# Patient Record
Sex: FEMALE | Race: WHITE
[De-identification: names, ages, dates, MRNs, and addresses within clinical notes are randomized per-mention and may not be internally consistent; named-entity substitution may affect disease eponyms.]

---

## 2019-10-12 ENCOUNTER — HOSPITAL ENCOUNTER (INPATIENT)
Dept: HOSPITAL 63 - ER | Age: 49
LOS: 3 days | Discharge: PSYCHIATRIC OR SUBSTANCE USE FACILITY | DRG: 918 | End: 2019-10-15
Attending: INTERNAL MEDICINE | Admitting: INTERNAL MEDICINE
Payer: COMMERCIAL

## 2019-10-12 VITALS — DIASTOLIC BLOOD PRESSURE: 86 MMHG | SYSTOLIC BLOOD PRESSURE: 128 MMHG

## 2019-10-12 VITALS — SYSTOLIC BLOOD PRESSURE: 141 MMHG | DIASTOLIC BLOOD PRESSURE: 78 MMHG

## 2019-10-12 VITALS — DIASTOLIC BLOOD PRESSURE: 81 MMHG | SYSTOLIC BLOOD PRESSURE: 142 MMHG

## 2019-10-12 VITALS — SYSTOLIC BLOOD PRESSURE: 152 MMHG | DIASTOLIC BLOOD PRESSURE: 85 MMHG

## 2019-10-12 VITALS — SYSTOLIC BLOOD PRESSURE: 152 MMHG | DIASTOLIC BLOOD PRESSURE: 96 MMHG

## 2019-10-12 VITALS — DIASTOLIC BLOOD PRESSURE: 90 MMHG | SYSTOLIC BLOOD PRESSURE: 149 MMHG

## 2019-10-12 VITALS — DIASTOLIC BLOOD PRESSURE: 70 MMHG | SYSTOLIC BLOOD PRESSURE: 124 MMHG

## 2019-10-12 VITALS — WEIGHT: 128 LBS | HEIGHT: 65 IN | BODY MASS INDEX: 21.33 KG/M2

## 2019-10-12 VITALS — SYSTOLIC BLOOD PRESSURE: 153 MMHG | DIASTOLIC BLOOD PRESSURE: 87 MMHG

## 2019-10-12 VITALS — DIASTOLIC BLOOD PRESSURE: 89 MMHG | SYSTOLIC BLOOD PRESSURE: 142 MMHG

## 2019-10-12 VITALS — DIASTOLIC BLOOD PRESSURE: 90 MMHG | SYSTOLIC BLOOD PRESSURE: 142 MMHG

## 2019-10-12 VITALS — DIASTOLIC BLOOD PRESSURE: 82 MMHG | SYSTOLIC BLOOD PRESSURE: 134 MMHG

## 2019-10-12 VITALS — SYSTOLIC BLOOD PRESSURE: 155 MMHG | DIASTOLIC BLOOD PRESSURE: 87 MMHG

## 2019-10-12 VITALS — DIASTOLIC BLOOD PRESSURE: 79 MMHG | SYSTOLIC BLOOD PRESSURE: 151 MMHG

## 2019-10-12 DIAGNOSIS — F17.210: ICD-10-CM

## 2019-10-12 DIAGNOSIS — Z79.891: ICD-10-CM

## 2019-10-12 DIAGNOSIS — Z90.710: ICD-10-CM

## 2019-10-12 DIAGNOSIS — M54.9: ICD-10-CM

## 2019-10-12 DIAGNOSIS — T48.1X2A: Primary | ICD-10-CM

## 2019-10-12 DIAGNOSIS — M41.9: ICD-10-CM

## 2019-10-12 DIAGNOSIS — T40.602A: ICD-10-CM

## 2019-10-12 DIAGNOSIS — I10: ICD-10-CM

## 2019-10-12 DIAGNOSIS — Z91.5: ICD-10-CM

## 2019-10-12 DIAGNOSIS — E78.5: ICD-10-CM

## 2019-10-12 DIAGNOSIS — G89.29: ICD-10-CM

## 2019-10-12 DIAGNOSIS — F32.9: ICD-10-CM
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
## 2019-10-12 DIAGNOSIS — E78.00: ICD-10-CM

## 2019-10-12 LAB
ACETAMIN: < 2 MCG/ML (ref 10–30)
ALBUMIN SERPL-MCNC: 3.8 G/DL (ref 3.4–5)
ALBUMIN SERPL-MCNC: 4.4 G/DL (ref 3.4–5)
ALBUMIN/GLOB SERPL: 1.1 {RATIO} (ref 1–1.7)
ALBUMIN/GLOB SERPL: 1.1 {RATIO} (ref 1–1.7)
ALP SERPL-CCNC: 64 U/L (ref 46–116)
ALP SERPL-CCNC: 76 U/L (ref 46–116)
ALT SERPL-CCNC: 18 U/L (ref 14–59)
ALT SERPL-CCNC: 22 U/L (ref 14–59)
AMORPH SED URNS QL MICRO: PRESENT /HPF
AMPHETAMINE/METHAMPHETAMINE: (no result)
ANION GAP SERPL CALC-SCNC: 11 MMOL/L (ref 6–14)
ANION GAP SERPL CALC-SCNC: 13 MMOL/L (ref 6–14)
APTT PPP: YELLOW S
AST SERPL-CCNC: 19 U/L (ref 15–37)
AST SERPL-CCNC: 20 U/L (ref 15–37)
BACTERIA #/AREA URNS HPF: 0 /HPF
BARBITURATES UR-MCNC: (no result) UG/ML
BASOPHILS # BLD AUTO: 0 X10^3/UL (ref 0–0.2)
BASOPHILS # BLD AUTO: 0 X10^3/UL (ref 0–0.2)
BASOPHILS NFR BLD: 0 % (ref 0–3)
BASOPHILS NFR BLD: 0 % (ref 0–3)
BENZODIAZ UR-MCNC: (no result) UG/L
BILIRUB SERPL-MCNC: 0.3 MG/DL (ref 0.2–1)
BILIRUB SERPL-MCNC: 0.4 MG/DL (ref 0.2–1)
BILIRUB UR QL STRIP: (no result)
BUN/CREAT SERPL: 16 (ref 6–20)
BUN/CREAT SERPL: 25 (ref 6–20)
CA-I SERPL ISE-MCNC: 13 MG/DL (ref 7–20)
CA-I SERPL ISE-MCNC: 20 MG/DL (ref 7–20)
CALCIUM SERPL-MCNC: 8.4 MG/DL (ref 8.5–10.1)
CALCIUM SERPL-MCNC: 9.5 MG/DL (ref 8.5–10.1)
CANNABINOIDS UR-MCNC: (no result) UG/L
CHLORIDE SERPL-SCNC: 103 MMOL/L (ref 98–107)
CHLORIDE SERPL-SCNC: 109 MMOL/L (ref 98–107)
CO2 SERPL-SCNC: 24 MMOL/L (ref 21–32)
CO2 SERPL-SCNC: 28 MMOL/L (ref 21–32)
COCAINE UR-MCNC: (no result) NG/ML
CREAT SERPL-MCNC: 0.8 MG/DL (ref 0.6–1)
CREAT SERPL-MCNC: 0.8 MG/DL (ref 0.6–1)
EOSINOPHIL NFR BLD: 0 % (ref 0–3)
EOSINOPHIL NFR BLD: 0 % (ref 0–3)
EOSINOPHIL NFR BLD: 0 X10^3/UL (ref 0–0.7)
EOSINOPHIL NFR BLD: 0 X10^3/UL (ref 0–0.7)
ERYTHROCYTE [DISTWIDTH] IN BLOOD BY AUTOMATED COUNT: 13.4 % (ref 11.5–14.5)
ERYTHROCYTE [DISTWIDTH] IN BLOOD BY AUTOMATED COUNT: 13.5 % (ref 11.5–14.5)
ETHANOL SERPL-MCNC: < 10 MG/DL (ref 0–10)
FIBRINOGEN PPP-MCNC: (no result) MG/DL
GFR SERPLBLD BASED ON 1.73 SQ M-ARVRAT: 75.6 ML/MIN
GFR SERPLBLD BASED ON 1.73 SQ M-ARVRAT: 75.6 ML/MIN
GLOBULIN SER-MCNC: 3.4 G/DL (ref 2.2–3.8)
GLOBULIN SER-MCNC: 4.1 G/DL (ref 2.2–3.8)
GLUCOSE SERPL-MCNC: 108 MG/DL (ref 70–99)
GLUCOSE SERPL-MCNC: 113 MG/DL (ref 70–99)
GLUCOSE UR STRIP-MCNC: (no result) MG/DL
HCT VFR BLD CALC: 37.6 % (ref 36–47)
HCT VFR BLD CALC: 42 % (ref 36–47)
HGB BLD-MCNC: 12.2 G/DL (ref 12–15.5)
HGB BLD-MCNC: 14.3 G/DL (ref 12–15.5)
LYMPHOCYTES # BLD: 1 X10^3/UL (ref 1–4.8)
LYMPHOCYTES # BLD: 1.1 X10^3/UL (ref 1–4.8)
LYMPHOCYTES NFR BLD AUTO: 15 % (ref 24–48)
LYMPHOCYTES NFR BLD AUTO: 9 % (ref 24–48)
MAGNESIUM SERPL-MCNC: 2.2 MG/DL (ref 1.8–2.4)
MAGNESIUM SERPL-MCNC: 2.7 MG/DL (ref 1.8–2.4)
MCH RBC QN AUTO: 30 PG (ref 25–35)
MCH RBC QN AUTO: 32 PG (ref 25–35)
MCHC RBC AUTO-ENTMCNC: 33 G/DL (ref 31–37)
MCHC RBC AUTO-ENTMCNC: 34 G/DL (ref 31–37)
MCV RBC AUTO: 92 FL (ref 79–100)
MCV RBC AUTO: 93 FL (ref 79–100)
METHADONE SERPL-MCNC: (no result) NG/ML
MONO #: 0.4 X10^3/UL (ref 0–1.1)
MONO #: 0.7 X10^3/UL (ref 0–1.1)
MONOCYTES NFR BLD: 5 % (ref 0–9)
MONOCYTES NFR BLD: 5 % (ref 0–9)
NEUT #: 10.4 X10^3UL (ref 1.8–7.7)
NEUT #: 6 X10^3UL (ref 1.8–7.7)
NEUTROPHILS NFR BLD AUTO: 80 % (ref 31–73)
NEUTROPHILS NFR BLD AUTO: 86 % (ref 31–73)
NITRITE UR QL STRIP: (no result)
OPIATES UR-MCNC: (no result) NG/ML
PCP SERPL-MCNC: (no result) MG/DL
PLATELET # BLD AUTO: 268 X10^3/UL (ref 140–400)
PLATELET # BLD AUTO: 299 X10^3/UL (ref 140–400)
POTASSIUM SERPL-SCNC: 3.7 MMOL/L (ref 3.5–5.1)
POTASSIUM SERPL-SCNC: 4 MMOL/L (ref 3.5–5.1)
PROT SERPL-MCNC: 7.2 G/DL (ref 6.4–8.2)
PROT SERPL-MCNC: 8.5 G/DL (ref 6.4–8.2)
RBC # BLD AUTO: 4.05 X10^6/UL (ref 3.5–5.4)
RBC # BLD AUTO: 4.55 X10^6/UL (ref 3.5–5.4)
RBC #/AREA URNS HPF: 0 /HPF (ref 0–2)
SALIC: 2.4 MG/DL (ref 2.8–20)
SODIUM SERPL-SCNC: 142 MMOL/L (ref 136–145)
SODIUM SERPL-SCNC: 146 MMOL/L (ref 136–145)
SP GR UR STRIP: 1.01
SQUAMOUS #/AREA URNS LPF: (no result) /LPF
UROBILINOGEN UR-MCNC: 0.2 MG/DL
WBC # BLD AUTO: 12.2 X10^3/UL (ref 4–11)
WBC # BLD AUTO: 7.6 X10^3/UL (ref 4–11)
WBC #/AREA URNS HPF: 0 /HPF (ref 0–4)

## 2019-10-12 PROCEDURE — 36415 COLL VENOUS BLD VENIPUNCTURE: CPT

## 2019-10-12 PROCEDURE — 80061 LIPID PANEL: CPT

## 2019-10-12 PROCEDURE — 80329 ANALGESICS NON-OPIOID 1 OR 2: CPT

## 2019-10-12 PROCEDURE — 80307 DRUG TEST PRSMV CHEM ANLYZR: CPT

## 2019-10-12 PROCEDURE — 85027 COMPLETE CBC AUTOMATED: CPT

## 2019-10-12 PROCEDURE — 81001 URINALYSIS AUTO W/SCOPE: CPT

## 2019-10-12 PROCEDURE — 93005 ELECTROCARDIOGRAM TRACING: CPT

## 2019-10-12 PROCEDURE — 82550 ASSAY OF CK (CPK): CPT

## 2019-10-12 PROCEDURE — 51702 INSERT TEMP BLADDER CATH: CPT

## 2019-10-12 PROCEDURE — 83735 ASSAY OF MAGNESIUM: CPT

## 2019-10-12 PROCEDURE — 82140 ASSAY OF AMMONIA: CPT

## 2019-10-12 PROCEDURE — 80048 BASIC METABOLIC PNL TOTAL CA: CPT

## 2019-10-12 PROCEDURE — G0480 DRUG TEST DEF 1-7 CLASSES: HCPCS

## 2019-10-12 PROCEDURE — 96361 HYDRATE IV INFUSION ADD-ON: CPT

## 2019-10-12 PROCEDURE — 85730 THROMBOPLASTIN TIME PARTIAL: CPT

## 2019-10-12 PROCEDURE — 96365 THER/PROPH/DIAG IV INF INIT: CPT

## 2019-10-12 PROCEDURE — 80053 COMPREHEN METABOLIC PANEL: CPT

## 2019-10-12 PROCEDURE — 83605 ASSAY OF LACTIC ACID: CPT

## 2019-10-12 PROCEDURE — 85610 PROTHROMBIN TIME: CPT

## 2019-10-12 PROCEDURE — 85025 COMPLETE CBC W/AUTO DIFF WBC: CPT

## 2019-10-12 RX ADMIN — SODIUM CHLORIDE SCH MLS/HR: 0.9 INJECTION, SOLUTION INTRAVENOUS at 14:08

## 2019-10-12 RX ADMIN — SODIUM CHLORIDE SCH MLS/HR: 0.9 INJECTION, SOLUTION INTRAVENOUS at 20:25

## 2019-10-12 NOTE — PDOC
Exam


Note:


[place] Note:


Please also refer to the separate dictated note~for this date of service 

dictated separately.~Patient seen individually. Discussed the patient with 

Nursing staff reviewed the chart.~Reviewed interim history and current 

functioning. Reviewed vital signs,~Labs/ Radiology~and current medications noted

below. Continue current treatment with the changes noted in the dictated 

addendum note





Assessment:


Vital Signs/I&O:





                                   Vital Signs








  Date Time  Temp Pulse Resp B/P (MAP) Pulse Ox O2 Delivery O2 Flow Rate FiO2


 


10/12/19 22:14  99 20     


 


10/12/19 22:11 98.9   142/90 (107) 98 Room Air  








Labs:





                                Laboratory Tests








Test


 10/12/19


08:30 10/12/19


18:00 10/12/19


18:04


 


White Blood Count


 7.6 x10^3/uL


(4.0-11.0) 12.2 x10^3/uL


(4.0-11.0)  H 





 


Red Blood Count


 4.55 x10^6/uL


(3.50-5.40) 4.05 x10^6/uL


(3.50-5.40) 





 


Hemoglobin


 14.3 g/dL


(12.0-15.5) 12.2 g/dL


(12.0-15.5) 





 


Hematocrit


 42.0 %


(36.0-47.0) 37.6 %


(36.0-47.0) 





 


Mean Corpuscular Volume


 92 fL ()


 93 fL ()


 





 


Mean Corpuscular Hemoglobin 32 pg (25-35)   30 pg (25-35)   


 


Mean Corpuscular Hemoglobin


Concent 34 g/dL


(31-37) 33 g/dL


(31-37) 





 


Red Cell Distribution Width


 13.5 %


(11.5-14.5) 13.4 %


(11.5-14.5) 





 


Platelet Count


 299 x10^3/uL


(140-400) 268 x10^3/uL


(140-400) 





 


Neutrophils (%) (Auto) 80 % (31-73)  H 86 % (31-73)  H 


 


Lymphocytes (%) (Auto) 15 % (24-48)  L 9 % (24-48)  L 


 


Monocytes (%) (Auto) 5 % (0-9)   5 % (0-9)   


 


Eosinophils (%) (Auto) 0 % (0-3)   0 % (0-3)   


 


Basophils (%) (Auto) 0 % (0-3)   0 % (0-3)   


 


Neutrophils # (Auto)


 6.0 x10^3uL


(1.8-7.7) 10.4 x10^3uL


(1.8-7.7)  H 





 


Lymphocytes # (Auto)


 1.1 x10^3/uL


(1.0-4.8) 1.0 x10^3/uL


(1.0-4.8) 





 


Monocytes # (Auto)


 0.4 x10^3/uL


(0.0-1.1) 0.7 x10^3/uL


(0.0-1.1) 





 


Eosinophils # (Auto)


 0.0 x10^3/uL


(0.0-0.7) 0.0 x10^3/uL


(0.0-0.7) 





 


Basophils # (Auto)


 0.0 x10^3/uL


(0.0-0.2) 0.0 x10^3/uL


(0.0-0.2) 





 


Prothrombin Time


 9.8 SEC


(9.4-11.4) 


 





 


Prothrombin Time INR 0.9 (0.9-1.1)    


 


Activated Partial


Thromboplast Time 26 SEC (23-33)


 


 





 


Urine Collection Type U cath    


 


Urine Color Yellow    


 


Urine Clarity Hazy    


 


Urine pH 5.0    


 


Urine Specific Gravity 1.010    


 


Urine Protein


 Neg


(NEG-TRACE) 


 





 


Urine Glucose (UA)


 Neg mg/dL


(NEG) 


 





 


Urine Ketones (Stick)


 Neg mg/dL


(NEG) 


 





 


Urine Blood Neg (NEG)    


 


Urine Nitrite Neg (NEG)    


 


Urine Bilirubin Neg (NEG)    


 


Urine Urobilinogen Dipstick


 0.2 mg/dL (0.2


mg/dL) 


 





 


Urine Leukocyte Esterase Neg (NEG)    


 


Urine RBC 0 /HPF (0-2)    


 


Urine WBC 0 /HPF (0-4)    


 


Urine Squamous Epithelial


Cells Occ /LPF  


 


 





 


Urine Amorphous Sediment Present /HPF    


 


Urine Bacteria


 0 /HPF (0-FEW)


 


 





 


Urine Mucus Slight /LPF    


 


Sodium Level


 142 mmol/L


(136-145) 


 146 mmol/L


(136-145)  H


 


Potassium Level


 4.0 mmol/L


(3.5-5.1) 


 3.7 mmol/L


(3.5-5.1)


 


Chloride Level


 103 mmol/L


() 


 109 mmol/L


()  H


 


Carbon Dioxide Level


 28 mmol/L


(21-32) 


 24 mmol/L


(21-32)


 


Anion Gap 11 (6-14)    13 (6-14)  


 


Blood Urea Nitrogen


 20 mg/dL


(7-20) 


 13 mg/dL


(7-20)


 


Creatinine


 0.8 mg/dL


(0.6-1.0) 


 0.8 mg/dL


(0.6-1.0)


 


Estimated GFR


(Cockcroft-Gault) 75.6  


 


 75.6  





 


BUN/Creatinine Ratio 25 (6-20)  H  16 (6-20)  


 


Glucose Level


 113 mg/dL


(70-99)  H 


 108 mg/dL


(70-99)  H


 


Lactic Acid Level


 1.0 mmol/L


(0.4-2.0) 


 





 


Calcium Level


 9.5 mg/dL


(8.5-10.1) 


 8.4 mg/dL


(8.5-10.1)  L


 


Magnesium Level


 2.2 mg/dL


(1.8-2.4) 


 2.7 mg/dL


(1.8-2.4)  H


 


Total Bilirubin


 0.3 mg/dL


(0.2-1.0) 


 0.4 mg/dL


(0.2-1.0)


 


Aspartate Amino Transferase


(AST) 20 U/L (15-37)


 


 19 U/L (15-37)





 


Alanine Aminotransferase (ALT)


 22 U/L (14-59)


 


 18 U/L (14-59)





 


Alkaline Phosphatase


 76 U/L


() 


 64 U/L


()


 


Ammonia


 < 10 mcmol/L


(11-34)  L 


 





 


Creatine Kinase


 95 U/L


() 


 109 U/L


()


 


Total Protein


 8.5 g/dL


(6.4-8.2)  H 


 7.2 g/dL


(6.4-8.2)


 


Albumin


 4.4 g/dL


(3.4-5.0) 


 3.8 g/dL


(3.4-5.0)


 


Albumin/Globulin Ratio 1.1 (1.0-1.7)    1.1 (1.0-1.7)  


 


Salicylates Level


 2.4 mg/dL


(2.8-20.0)  L 


 





 


Salicylate Last Dose Date Unknown    


 


Salicylate Last Dose Time Unknown    


 


Urine Opiates Screen Pos (NEG)    


 


Urine Methadone Screen Neg (NEG)    


 


Acetaminophen Level


 < 2.0 mcg/mL


(10-30)  L 


 





 


Acetaminophen Last Dose Date Unknown    


 


Acetaminophen Last Dose Time Unknown    


 


Urine Barbiturates Neg (NEG)    


 


Urine Phencyclidine Screen Neg (NEG)    


 


Urine


Amphetamine/Methamphetamine Neg (NEG)  


 


 





 


Urine Benzodiazepines Screen Neg (NEG)    


 


Urine Cocaine Screen Neg (NEG)    


 


Urine Cannabinoids Screen Neg (NEG)    


 


Ethyl Alcohol Level


 < 10 mg/dL


(0-10) 


 





 


Urine Ethyl Alcohol Pos (NEG)    











Current Medications:


Meds:





Current Medications








 Medications


  (Trade)  Dose


 Ordered  Sig/Valerie


 Route


 PRN Reason  Start Time


 Stop Time Status Last Admin


Dose Admin


 


 Sodium Chloride  1,000 ml @ 


 1,000 mls/hr  1X  ONCE


 IV


   10/12/19 08:30


 10/12/19 09:29 DC 10/12/19 08:30





 


 Multivitamins/


 Minerals 10 ml/


 Folic Acid 1 mg/


 Thiamine HCl 100


 mg/Sodium Chloride  1,011.1 ml


  @ 1,000 mls/


 hr  1X  ONCE


 IV


   10/12/19 10:00


 10/12/19 11:00 DC 10/12/19 10:00





 


 Sodium Chloride  1,000 ml @ 


 100 mls/hr  Q10H


 IV


   10/12/19 12:15


    10/12/19 20:25





 


 Magnesium Sulfate  50 ml @ 25


 mls/hr  1X  ONCE


 IV


   10/12/19 14:00


 10/12/19 15:59 DC 10/12/19 14:08





 


 Lorazepam


  (Ativan Inj)  1 mg  PRN Q2HR  PRN


 IVP


 ANXIETY / AGITATION  10/12/19 18:30


 10/12/19 21:31 DC 10/12/19 20:25





 


 Lorazepam


  (Ativan Inj)  2 mg  PRN Q2HR  PRN


 IVP


 ANXIETY / AGITATION  10/12/19 21:30


    10/12/19 22:14











I have reviewed the current psychotropics carefully including drug interactions.

 Risk benefit ratio favors no change other than as noted in my dictated progress

note.





Diagnosis:


Problems:  


(1) Overdose


(2) Suicide attempt











ZAYRA DAY MD                 Oct 12, 2019 22:53

## 2019-10-12 NOTE — PHYS DOC
Past History


Past Medical History:  Depression, High Cholesterol, Hypertension


Additional Past Medical Histor:  Chronic back pain, Scoliosis, hx of Suicide 

attempt in 1990s


Past Medical History


Limited due to altered mental status.


Past Surgical History:  Cholecystectomy, Hysterectomy


Additional Past Surgical Histo:  Breast augmentation, nerve ablations in back


Past Surgical History


Limited due to altered mental status.


Smoking:  Cigarettes (socially), Less than 1pk/day


Alcohol Use:  Occasionally (socially)


Drug Use:  None


Social History


Limited due to altered mental status.





Adult General


Chief Complaint


Chief Complaint:  Altered mental status/Overdose





HPI


HPI


49-year-old female presents with her  with report of altered mental 

status this morning. Spouse reports waking this AM and finding patient was not 

in bed.  Reports finding patient out in the garage "passed out" in the passenger

side of his vehicle.  Reports she was difficult to arouse and was altered.  

Reports finding empty Flexeril bottle on kitchen counter and another one in the 

living room.  Patient has chronic back pain and takes "morphine", "Vicodin", and

Flexeril for her back. Empty prescription bottles are from 7/19/19 and 10/10/19 

and both are 10 mg Flexeril tablets for which patient was prescribed 90 tablets 

and can take one tablet 3 times daily as needed.  Spouse denies finding the 

other medications or any other medications that patient might have taken.  

Reports she was drinking "tequila shots" last night.  Spouse reports last seen 

normal at approximately 2300 last night. Reports he did receive a text from his 

mother in law around midnight last night reporting some concern that "something 

was wrong" , but he didn't see the text until this AM.  Spouse reports patient 

has a history of depression and a prior suicide attempt in the 1990s.  Reports 

patient has seemed more depressed recently and has some increased life 

stressors. Reports recently moved to [place] approximately 2 years ago.  Patient does

not work and doesn't really have any friends out here.  Spouse reports he is 

gone 70% of the time for work.   Spouse does report hx of recent ablation 

therapy to patient's back with pain specialist an [place] on Thursday.  He

reports that is who prescribes her medications.





History of present illness limited due to patient's altered mental status.  

Majority of HPI and history obtained from spouse. NO prior records per [facility] 

review for comparison.





Review of Systems


Review of Systems





Constitutional: Denies fever or chills 


GI: Denies vomiting





Review of systems limited due to patient's altered mental status





Current Medications


Current Medications





Current Medications








 Medications


  (Trade)  Dose


 Ordered  Sig/Valerie  Start Time


 Stop Time Status Last Admin


Dose Admin


 


 Sodium Chloride  1,000 ml @ 


 1,000 mls/hr  1X  ONCE  10/12/19 08:30


 10/12/19 09:29 UNV  














Physical Exam


Physical Exam





Constitutional: Well developed, well nourished, confused, non-toxic appearance


HENT: Normocephalic, atraumatic, oropharynx moist


Eyes: PERRL, EOMI, conjunctiva normal, no discharge, horizontal nystagmus noted


Neck: Normal range of motion, no tenderness, supple


Cardiovascular: Heart rate normal, regular rhythm


Lungs & Thorax:  Bilateral breath sounds clear to auscultation, no wheezing


Abdomen: Soft, no tenderness


Skin: Warm, dry, no erythema, no rash, old ecchymosis noted to bilateral AC 

regions


Extremities: No tenderness, ROM intact, no edema, moving all extremities


Neurologic: Obtunded, GCS 11 (eye4 verbal2 motor5),  no focal deficits noted


Psychologic: Unable to fully obtain, judgement abnormal





EKG


EKG


@0821 NSR at 98bpm, NO ST elevation, QRS 76ms, QT/QTc 394/505ms





Radiology/Procedures


Radiology/Procedures


[]





Course & Med Decision Making


Course & Med Decision Making


Pertinent Lab studies reviewed. (See chart for details)





Patient presents with altered mental status and history of possibly taking 

Flexeril 10 mg tablets which were recently filled on 10/10/2019 90 tabs.  

Another empty bottle of Flexeril also found but was filled in July 2019.  GCS 

11. EKG obtained. Labs obtained and posted to chart. IV fluid hydration given.





Poison control notified and recommend supportive care and monitoring for next 24

 hours.





Patient requiring admission for further evaluation and treatment. Discussed with

 Dr. Pizarro (hospitalist) who is in agreement with admission. Discussed findings 

and plan with spouse, who acknowledges understanding and agreement.





Dragon Disclaimer


Dragon Disclaimer


This electronic medical record was generated, in whole or in part, using a voice

 recognition dictation system.





Departure


Departure:


Impression:  


   Primary Impression:  


   Suicide attempt


   Additional Impression:  


   Overdose


Disposition:  09 ADMITTED AS INPATIENT


Admitting Physician:  Marcelina Pizarro


Condition:  GUARDED





Critical Care Time


Critical care time was 30 minutes which includes time at bedside, spent in 

discussion of patient's care with specialists and/or family members, with 

interpretation of laboratory and/or radiological studies and is exclusive of 

procedures.





Problem Qualifiers








   Additional Impression:  


   Overdose


   Encounter type:  initial encounter  Injury intent:  intentional self-harm  

   Qualified Codes:  T50.902A - Poisoning by unspecified drugs, medicaments and 

   biological substances, intentional self-harm, initial encounter








KATHLEEN CHEN DO             Oct 12, 2019 08:25

## 2019-10-12 NOTE — HP
ADMIT DATE:  10/12/2019



HISTORY OF PRESENT ILLNESS:  The patient is a 49-year-old  female

patient who presented to the Emergency Room with reports of altered mental

status this morning.  Her  reported waking this morning and finding the

patient was not in bed.  He found her in the garage, passed out in the passenger

side of his vehicle.  She was difficult to arouse and was altered.  He found

also an empty Flexeril bottle on kitchen counter and another one in the living

room.  The patient is known to have chronic back pain and takes morphine,

Vicodin, and Flexeril for her back.  Empty prescription bottles are from

07/19/2019 and 10/10/2019, and both are 10 mg Flexeril tablets for which the

patient was prescribed 90 tablets and can take 1 tablet 3 times a day as needed.

 The  denied finding any other medications, patient that might have

taken.  He said she was drinking also tequila shots last night.  She was last

seen as normal at around 2300 last night.  The  reported that he did

receive a text from his mother-in-law around midnight last night reporting some

concern that something was wrong, but he did not see the text until this

morning.  The  reports the patient has a history of depression with prior

suicidal attempt in 1990.  He reports the patient seemed more depressed recently

and has some increased life stressors.  She recently moved to [place]

approximately 2 years ago.  She does not work and does not really have any

friends out here.  He stated that he has gone 70% of time for work.  She

apparently has recently had an ablation therapy for the patient's back with pain

specialist in [place].  On Thursday, she was evaluated in the Emergency

Room, apparently was obtunded with a [place] coma scale of 11 with no obvious

focal deficit and was admitted to ICU for close observation.  Her initial EKG

showed that she was in sinus rhythm with a heart rate of 98 per minute, no ST

segment elevation.  Her QRS complex was 76 milliseconds, QT interval was 505

millisecond.  Her chest x-ray and her lab work were unremarkable.  Urinalysis

was unremarkable and toxic screen was positive for opiates as well as alcohol,

although the blood alcohol level was less than 10 and was admitted with suicidal

attempt with an overdose of Flexeril and perhaps morphine as well as alcohol. 

We did contact the Poison Control Center who recommended treating her with

bicarb if her corrected QT interval is more than 500 milliseconds and to start

bicarb drip, if the QRS complex is more than 100 milliseconds.



PAST MEDICAL HISTORY:  Significant for depression, hyperlipidemia, hypertension,

chronic back pain, scoliosis, apparently has a history of suicide attempt in

1990.



PAST SURGICAL HISTORY:  Significant for cholecystectomy, hysterectomy, breast

augmentation surgery, as well as nerve ablation of the back recently.



ALLERGIES:  She is allergic to GABAPENTIN.



MEDICATIONS:  She is currently on cyclobenzaprine 10 mg 3 times a day, meloxicam

15 mg daily, hydrocodone/APAP 10/325 one tablet every 6 hours.  She is also on

morphine sulfate extended release 30 mg twice a day and Wellbutrin 150 mg once a

day and duloxetine 60 mg twice a day.



FAMILY HISTORY:  Unremarkable.



SOCIAL HISTORY:  She is  and lives with her .  She apparently

smokes 1 pack a day and drinks alcohol occasionally and does not use any drugs.



REVIEW OF SYSTEMS:  Obviously unobtainable.



PHYSICAL EXAMINATION:

GENERAL:  On arrival to the Emergency Room, the patient was obtunded, does open

her eyes and moves limbs spontaneously without any obvious lateralizing sign. 

There was no pallor, jaundice, or cyanosis.  No lymphadenopathy, no thyromegaly.

 No jugular venous distention.  No lower limb edema.

VITAL SIGNS:  Her heart rate was 95, blood pressure was 142/89, temperature was

97.4, respiratory rate was 15, and oxygen saturation was 100%.

HEAD, EYES, EARS, NOSE, AND THROAT:  Normocephalic, atraumatic.

NECK:  Supple.

HEART:  Showed normal first and second heart sounds.  No gallop or murmur.

CHEST:  Clear to auscultation.  No crepitation or rhonchi.

ABDOMEN:  Distended, soft, nontender.

NEUROLOGIC:  She is obtunded, but all her cranial nerves seem to be grossly

intact.

EXTREMITIES:  She moves extremities spontaneously; however, she is very

restless, agitated, and required wrist restraints.



LABORATORY DATA:  Her lab work this morning showed a white cell count of 7600,

hemoglobin 14, hematocrit 42, MCV 92, and platelet count of 299,000, with normal

manual differential.  Her serum sodium is 142, potassium 4, chloride 103,

bicarbonate 28, anion gap of 11, BUN 20, creatinine 0.8, estimated GFR was 76 mL

per minute.  Her glucose was 113, lactic acid was 1.  Calcium was 9.5, magnesium

was 2.2.  Total bilirubin, AST, ALT, alkaline phosphatase were normal.  Her

ammonia was less than 10 and CK was 95.  Total protein was 8.5 and albumin was

4.4.  Her prothrombin time, INR, and aPTT were normal.  Urinalysis was

unremarkable and toxic screen was positive for opiates as well as alcohol.



ASSESSMENT AND PLAN:  So, in summary, this is a 49-year-old,  female

patient who was admitted with suicidal attempt and overdose of cyclobenzaprine. 

She is also on morphine and has been drinking tequila shots.  She has multiple

other medical problems including hypertension, hyperlipidemia, depression, and

chronic back pain.  The plan is to continue with IV fluid, continue with perhaps

alcohol withdrawal protocol.  I did speak with the Poison Control Center and

their recommendation was to treat her with magnesium sulfate 2 grams IV if the

QT interval is more than 500 and do bicarbonate drip if the QRS complex was more

than 100 milliseconds.  Obviously when she wakes up, we will consult Dr. Goodwin

to see her and she probably needs some inpatient psychiatric treatment.





______________________________

IDALIA GROSS MD



DR:  BRITTNEY/tia  JOB#:  [id number] / [id number]

DD:  10/12/2019 14:09  DT:  10/12/2019 14:49

## 2019-10-12 NOTE — EKG
[facility] [address], [place] [address]

Test Date:    2019-10-12               Test Time:    20:43:13

Pat Name:     IHSAN JACINTO           Department:   

Patient ID:   [id number]           Room:         [facility]02 1

Gender:       F                        Technician:   

:          1970               Requested By: IDALIA GROSS

Order Number: [id number]            Reading MD:     

                                 Measurements

Intervals                              Axis          

Rate:         105                      P:            76

ND:           152                      QRS:          -5

QRSD:         78                       T:            234

QT:           346                                    

QTc:          461                                    

                           Interpretive Statements

SINUS TACHYCARDIA

LEFTWARD AXIS

OTHERWISE NORMAL ECG

RI6.02

No previous ECG available for comparison

## 2019-10-12 NOTE — EKG
[facility] [address], [place] [address]

Test Date:    2019-10-12               Test Time:    17:16:45

Pat Name:     IHSAN JACINTO           Department:   

Patient ID:   [id number]           Room:          

Gender:       F                        Technician:   

:          1970               Requested By: KATHLEEN CHEN

Order Number: [id number]            Reading MD:     

                                 Measurements

Intervals                              Axis          

Rate:         103                      P:            -26

IA:           118                      QRS:          -9

QRSD:         80                       T:            177

QT:           388                                    

QTc:          511                                    

                           Interpretive Statements

SINUS TACHYCARDIA

ATRIAL PREMATURE COMPLEX(ES)

LEFTWARD AXIS

T ABNORMALITY IN ANTERIOR LEADS

LATERAL LEADS

ABNORMAL ECG

RI6.02

No previous ECG available for comparison

## 2019-10-13 VITALS — DIASTOLIC BLOOD PRESSURE: 90 MMHG | SYSTOLIC BLOOD PRESSURE: 158 MMHG

## 2019-10-13 VITALS — SYSTOLIC BLOOD PRESSURE: 144 MMHG | DIASTOLIC BLOOD PRESSURE: 84 MMHG

## 2019-10-13 VITALS — DIASTOLIC BLOOD PRESSURE: 95 MMHG | SYSTOLIC BLOOD PRESSURE: 143 MMHG

## 2019-10-13 VITALS — DIASTOLIC BLOOD PRESSURE: 94 MMHG | SYSTOLIC BLOOD PRESSURE: 148 MMHG

## 2019-10-13 VITALS — SYSTOLIC BLOOD PRESSURE: 141 MMHG | DIASTOLIC BLOOD PRESSURE: 83 MMHG

## 2019-10-13 VITALS — DIASTOLIC BLOOD PRESSURE: 86 MMHG | SYSTOLIC BLOOD PRESSURE: 137 MMHG

## 2019-10-13 VITALS — DIASTOLIC BLOOD PRESSURE: 95 MMHG | SYSTOLIC BLOOD PRESSURE: 135 MMHG

## 2019-10-13 VITALS — DIASTOLIC BLOOD PRESSURE: 100 MMHG | SYSTOLIC BLOOD PRESSURE: 143 MMHG

## 2019-10-13 VITALS — SYSTOLIC BLOOD PRESSURE: 132 MMHG | DIASTOLIC BLOOD PRESSURE: 86 MMHG

## 2019-10-13 VITALS — SYSTOLIC BLOOD PRESSURE: 150 MMHG | DIASTOLIC BLOOD PRESSURE: 77 MMHG

## 2019-10-13 VITALS — DIASTOLIC BLOOD PRESSURE: 94 MMHG | SYSTOLIC BLOOD PRESSURE: 140 MMHG

## 2019-10-13 VITALS — DIASTOLIC BLOOD PRESSURE: 94 MMHG | SYSTOLIC BLOOD PRESSURE: 144 MMHG

## 2019-10-13 VITALS — SYSTOLIC BLOOD PRESSURE: 148 MMHG | DIASTOLIC BLOOD PRESSURE: 88 MMHG

## 2019-10-13 VITALS — DIASTOLIC BLOOD PRESSURE: 106 MMHG | SYSTOLIC BLOOD PRESSURE: 150 MMHG

## 2019-10-13 VITALS — DIASTOLIC BLOOD PRESSURE: 102 MMHG | SYSTOLIC BLOOD PRESSURE: 153 MMHG

## 2019-10-13 VITALS — DIASTOLIC BLOOD PRESSURE: 88 MMHG | SYSTOLIC BLOOD PRESSURE: 128 MMHG

## 2019-10-13 VITALS — SYSTOLIC BLOOD PRESSURE: 120 MMHG | DIASTOLIC BLOOD PRESSURE: 93 MMHG

## 2019-10-13 VITALS — SYSTOLIC BLOOD PRESSURE: 145 MMHG | DIASTOLIC BLOOD PRESSURE: 89 MMHG

## 2019-10-13 VITALS — DIASTOLIC BLOOD PRESSURE: 92 MMHG | SYSTOLIC BLOOD PRESSURE: 140 MMHG

## 2019-10-13 VITALS — DIASTOLIC BLOOD PRESSURE: 99 MMHG | SYSTOLIC BLOOD PRESSURE: 127 MMHG

## 2019-10-13 VITALS — DIASTOLIC BLOOD PRESSURE: 84 MMHG | SYSTOLIC BLOOD PRESSURE: 121 MMHG

## 2019-10-13 VITALS — SYSTOLIC BLOOD PRESSURE: 134 MMHG | DIASTOLIC BLOOD PRESSURE: 96 MMHG

## 2019-10-13 LAB
ALBUMIN SERPL-MCNC: 3.5 G/DL (ref 3.4–5)
ALBUMIN/GLOB SERPL: 1.2 {RATIO} (ref 1–1.7)
ALP SERPL-CCNC: 61 U/L (ref 46–116)
ALT SERPL-CCNC: 19 U/L (ref 14–59)
ANION GAP SERPL CALC-SCNC: 11 MMOL/L (ref 6–14)
AST SERPL-CCNC: 27 U/L (ref 15–37)
BILIRUB SERPL-MCNC: 0.3 MG/DL (ref 0.2–1)
BUN/CREAT SERPL: 13 (ref 6–20)
CA-I SERPL ISE-MCNC: 9 MG/DL (ref 7–20)
CALCIUM SERPL-MCNC: 8.1 MG/DL (ref 8.5–10.1)
CHLORIDE SERPL-SCNC: 109 MMOL/L (ref 98–107)
CHOLEST/HDLC SERPL: 4 {RATIO}
CO2 SERPL-SCNC: 23 MMOL/L (ref 21–32)
CREAT SERPL-MCNC: 0.7 MG/DL (ref 0.6–1)
ERYTHROCYTE [DISTWIDTH] IN BLOOD BY AUTOMATED COUNT: 13.6 % (ref 11.5–14.5)
GFR SERPLBLD BASED ON 1.73 SQ M-ARVRAT: 88.2 ML/MIN
GLOBULIN SER-MCNC: 2.9 G/DL (ref 2.2–3.8)
GLUCOSE SERPL-MCNC: 82 MG/DL (ref 70–99)
HCT VFR BLD CALC: 37.2 % (ref 36–47)
HDLC SERPL-MCNC: 45 MG/DL (ref 40–60)
HGB BLD-MCNC: 12.4 G/DL (ref 12–15.5)
LDLC: 132 MG/DL (ref 0–100)
MAGNESIUM SERPL-MCNC: 2.5 MG/DL (ref 1.8–2.4)
MCH RBC QN AUTO: 31 PG (ref 25–35)
MCHC RBC AUTO-ENTMCNC: 33 G/DL (ref 31–37)
MCV RBC AUTO: 94 FL (ref 79–100)
PLATELET # BLD AUTO: 206 X10^3/UL (ref 140–400)
POTASSIUM SERPL-SCNC: 4 MMOL/L (ref 3.5–5.1)
PROT SERPL-MCNC: 6.4 G/DL (ref 6.4–8.2)
RBC # BLD AUTO: 3.97 X10^6/UL (ref 3.5–5.4)
SODIUM SERPL-SCNC: 143 MMOL/L (ref 136–145)
TRIGL SERPL-MCNC: 112 MG/DL (ref 0–150)
VLDLC: 22 MG/DL (ref 0–40)
WBC # BLD AUTO: 9.1 X10^3/UL (ref 4–11)

## 2019-10-13 RX ADMIN — SODIUM CHLORIDE SCH MLS/HR: 0.9 INJECTION, SOLUTION INTRAVENOUS at 01:25

## 2019-10-13 RX ADMIN — SODIUM CHLORIDE SCH MLS/HR: 0.9 INJECTION, SOLUTION INTRAVENOUS at 19:03

## 2019-10-13 RX ADMIN — SODIUM CHLORIDE SCH MLS/HR: 0.9 INJECTION, SOLUTION INTRAVENOUS at 10:15

## 2019-10-13 NOTE — EKG
[facility] [address], [place] [address]

Test Date:    2019-10-13               Test Time:    00:34:21

Pat Name:     IHSAN JACINTO           Department:   

Patient ID:   [id number]           Room:         [facility]02 1

Gender:       F                        Technician:   

:          1970               Requested By: IDALIA GROSS

Order Number: [id number]            Reading MD:     

                                 Measurements

Intervals                              Axis          

Rate:         98                       P:            57

[place]:           154                      QRS:          -16

QRSD:         80                       T:            -38

QT:           394                                    

QTc:          505                                    

                           Interpretive Statements

SINUS RHYTHM

LEFTWARD AXIS

PROLONGED QT

NO SPECIFIC ECG ABNORMALITIES

RI6.02

No previous ECG available for comparison

## 2019-10-13 NOTE — EKG
[facility] [address], [place] [address]

Test Date:    2019-10-13               Test Time:    05:13:07

Pat Name:     IHSAN JACINTO           Department:   

Patient ID:   [id number]           Room:         [facility]02 1

Gender:       F                        Technician:   

:          1970               Requested By: IDALIA GROSS

Order Number: [id number]            Reading MD:     

                                 Measurements

Intervals                              Axis          

Rate:         95                       P:            90

[place]:           148                      QRS:          -1

QRSD:         78                       T:            39

QT:           384                                    

QTc:          486                                    

                           Interpretive Statements

SINUS RHYTHM

ATRIAL PREMATURE COMPLEX(ES)

LEFTWARD AXIS

PROLONGED QT

NO SPECIFIC ECG ABNORMALITIES

RI6.02

No previous ECG available for comparison

## 2019-10-13 NOTE — EKG
[facility] [address], [place] [address]

Test Date:    2019-10-12               Test Time:    22:28:26

Pat Name:     IHSAN JACINTO           Department:   

Patient ID:   [id number]           Room:         [facility]02 1

Gender:       F                        Technician:   

:          1970               Requested By: IDALIA GROSS

Order Number: [id number]            Reading MD:     

                                 Measurements

Intervals                              Axis          

Rate:         98                       P:            64

UT:           154                      QRS:          -9

QRSD:         80                       T:            36

QT:           402                                    

QTc:          515                                    

                           Interpretive Statements

SINUS RHYTHM

LEFTWARD AXIS

PROLONGED QT

NO SPECIFIC ECG ABNORMALITIES

RI6.02

No previous ECG available for comparison

## 2019-10-14 VITALS — DIASTOLIC BLOOD PRESSURE: 74 MMHG | SYSTOLIC BLOOD PRESSURE: 146 MMHG

## 2019-10-14 VITALS — SYSTOLIC BLOOD PRESSURE: 146 MMHG | DIASTOLIC BLOOD PRESSURE: 88 MMHG

## 2019-10-14 VITALS — DIASTOLIC BLOOD PRESSURE: 80 MMHG | SYSTOLIC BLOOD PRESSURE: 140 MMHG

## 2019-10-14 VITALS — DIASTOLIC BLOOD PRESSURE: 83 MMHG | SYSTOLIC BLOOD PRESSURE: 144 MMHG

## 2019-10-14 VITALS — SYSTOLIC BLOOD PRESSURE: 141 MMHG | DIASTOLIC BLOOD PRESSURE: 97 MMHG

## 2019-10-14 VITALS — DIASTOLIC BLOOD PRESSURE: 84 MMHG | SYSTOLIC BLOOD PRESSURE: 128 MMHG

## 2019-10-14 VITALS — SYSTOLIC BLOOD PRESSURE: 146 MMHG | DIASTOLIC BLOOD PRESSURE: 92 MMHG

## 2019-10-14 VITALS — DIASTOLIC BLOOD PRESSURE: 93 MMHG | SYSTOLIC BLOOD PRESSURE: 140 MMHG

## 2019-10-14 VITALS — DIASTOLIC BLOOD PRESSURE: 83 MMHG | SYSTOLIC BLOOD PRESSURE: 128 MMHG

## 2019-10-14 VITALS — SYSTOLIC BLOOD PRESSURE: 112 MMHG | DIASTOLIC BLOOD PRESSURE: 87 MMHG

## 2019-10-14 VITALS — SYSTOLIC BLOOD PRESSURE: 136 MMHG | DIASTOLIC BLOOD PRESSURE: 88 MMHG

## 2019-10-14 VITALS — SYSTOLIC BLOOD PRESSURE: 149 MMHG | DIASTOLIC BLOOD PRESSURE: 95 MMHG

## 2019-10-14 LAB
ANION GAP SERPL CALC-SCNC: 14 MMOL/L (ref 6–14)
CA-I SERPL ISE-MCNC: 10 MG/DL (ref 7–20)
CALCIUM SERPL-MCNC: 8.7 MG/DL (ref 8.5–10.1)
CHLORIDE SERPL-SCNC: 108 MMOL/L (ref 98–107)
CO2 SERPL-SCNC: 19 MMOL/L (ref 21–32)
CREAT SERPL-MCNC: 0.8 MG/DL (ref 0.6–1)
GFR SERPLBLD BASED ON 1.73 SQ M-ARVRAT: 75.6 ML/MIN
GLUCOSE SERPL-MCNC: 78 MG/DL (ref 70–99)
POTASSIUM SERPL-SCNC: 3.6 MMOL/L (ref 3.5–5.1)
SODIUM SERPL-SCNC: 141 MMOL/L (ref 136–145)

## 2019-10-14 RX ADMIN — SODIUM CHLORIDE SCH MLS/HR: 0.9 INJECTION, SOLUTION INTRAVENOUS at 22:43

## 2019-10-14 RX ADMIN — ENOXAPARIN SODIUM SCH MG: 100 INJECTION SUBCUTANEOUS at 16:24

## 2019-10-14 RX ADMIN — SODIUM CHLORIDE SCH MLS/HR: 0.9 INJECTION, SOLUTION INTRAVENOUS at 10:00

## 2019-10-14 NOTE — PN
DATE:  10/13/2019



SUBJECTIVE:  The patient continued to be extremely lethargic and sleepy.  She

was treated with Ativan, received a total of 8 mg overnight.  The last one was

given at 5:00 this morning.  Her most recent EKG at around 5:00 this morning

showed her corrected QT interval of 486.



PHYSICAL EXAMINATION:

GENERAL:  When I examined her this afternoon, she looked well and was clearly in

no apparent respiratory distress.  No pallor, jaundice, cyanosis or thyromegaly.

 No jugular venous distention.  No lower limb edema.

VITAL SIGNS:  Her heart rate was 100, blood pressure 150/77, temperature was 37

degrees centigrade, respiratory rate 20, and oxygen saturation was 95% on room

air.

The rest of clinical exam stable, has not really changed.



Her intake over the last 24 hours was 2000, output was 2150.



LABORATORY WORK:  Showed a white cell count of 9100, hemoglobin 12.4, hematocrit

____, MCV 94 and platelet count of 206,000.  Her chemistry showed a serum sodium

143, potassium 4, chloride 109, bicarbonate 23, anion gap of 11, BUN 9,

creatinine 0.7, estimated GFR was 88 mL per minute.  Her glucose was ____,

calcium was 8.1, normal.



ASSESSMENT:  An overdose of cyclobenzaprine ____ 90 tablets for suicide attempt.

 Other medical problems include hypertension, hyperlipidemia, depression,

chronic back pain.



PLAN:  To continue with IV fluid.  Once the patient is more awake, alert, we

will consult the psychiatrist and arrange for inpatient psychiatric

stabilization and treatment.





______________________________

IDALIA GROSS MD



DR:  BRITTNEY/tia  JOB#:  [id number] / [id number]

DD:  10/13/2019 16:05  DT:  10/14/2019 02:18

## 2019-10-15 VITALS — SYSTOLIC BLOOD PRESSURE: 108 MMHG | DIASTOLIC BLOOD PRESSURE: 82 MMHG

## 2019-10-15 VITALS — DIASTOLIC BLOOD PRESSURE: 74 MMHG | SYSTOLIC BLOOD PRESSURE: 116 MMHG

## 2019-10-15 VITALS — DIASTOLIC BLOOD PRESSURE: 72 MMHG | SYSTOLIC BLOOD PRESSURE: 104 MMHG

## 2019-10-15 VITALS — DIASTOLIC BLOOD PRESSURE: 91 MMHG | SYSTOLIC BLOOD PRESSURE: 133 MMHG

## 2019-10-15 VITALS — DIASTOLIC BLOOD PRESSURE: 80 MMHG | SYSTOLIC BLOOD PRESSURE: 108 MMHG

## 2019-10-15 VITALS — DIASTOLIC BLOOD PRESSURE: 94 MMHG | SYSTOLIC BLOOD PRESSURE: 140 MMHG

## 2019-10-15 VITALS — DIASTOLIC BLOOD PRESSURE: 67 MMHG | SYSTOLIC BLOOD PRESSURE: 113 MMHG

## 2019-10-15 VITALS — SYSTOLIC BLOOD PRESSURE: 116 MMHG | DIASTOLIC BLOOD PRESSURE: 74 MMHG

## 2019-10-15 VITALS — SYSTOLIC BLOOD PRESSURE: 127 MMHG | DIASTOLIC BLOOD PRESSURE: 89 MMHG

## 2019-10-15 VITALS — DIASTOLIC BLOOD PRESSURE: 89 MMHG | SYSTOLIC BLOOD PRESSURE: 126 MMHG

## 2019-10-15 LAB
ANION GAP SERPL CALC-SCNC: 12 MMOL/L (ref 6–14)
ANION GAP SERPL CALC-SCNC: 14 MMOL/L (ref 6–14)
CA-I SERPL ISE-MCNC: 14 MG/DL (ref 7–20)
CA-I SERPL ISE-MCNC: 15 MG/DL (ref 7–20)
CALCIUM SERPL-MCNC: 8.5 MG/DL (ref 8.5–10.1)
CALCIUM SERPL-MCNC: 8.5 MG/DL (ref 8.5–10.1)
CHLORIDE SERPL-SCNC: 108 MMOL/L (ref 98–107)
CHLORIDE SERPL-SCNC: 110 MMOL/L (ref 98–107)
CO2 SERPL-SCNC: 20 MMOL/L (ref 21–32)
CO2 SERPL-SCNC: 22 MMOL/L (ref 21–32)
CREAT SERPL-MCNC: 0.9 MG/DL (ref 0.6–1)
CREAT SERPL-MCNC: 1 MG/DL (ref 0.6–1)
GFR SERPLBLD BASED ON 1.73 SQ M-ARVRAT: 58.5 ML/MIN
GFR SERPLBLD BASED ON 1.73 SQ M-ARVRAT: 66 ML/MIN
GLUCOSE SERPL-MCNC: 156 MG/DL (ref 70–99)
GLUCOSE SERPL-MCNC: 80 MG/DL (ref 70–99)
POTASSIUM SERPL-SCNC: 3 MMOL/L (ref 3.5–5.1)
POTASSIUM SERPL-SCNC: 3.6 MMOL/L (ref 3.5–5.1)
SODIUM SERPL-SCNC: 142 MMOL/L (ref 136–145)
SODIUM SERPL-SCNC: 144 MMOL/L (ref 136–145)

## 2019-10-15 RX ADMIN — POTASSIUM CHLORIDE SCH MEQ: 1500 TABLET, EXTENDED RELEASE ORAL at 10:03

## 2019-10-15 RX ADMIN — DULOXETINE SCH MG: 60 CAPSULE, DELAYED RELEASE ORAL at 20:49

## 2019-10-15 RX ADMIN — HYDROCODONE BITARTRATE AND ACETAMINOPHEN PRN TAB: 10; 325 TABLET ORAL at 10:04

## 2019-10-15 RX ADMIN — HYDROCODONE BITARTRATE AND ACETAMINOPHEN PRN TAB: 10; 325 TABLET ORAL at 15:36

## 2019-10-15 RX ADMIN — ENOXAPARIN SODIUM SCH MG: 100 INJECTION SUBCUTANEOUS at 15:00

## 2019-10-15 RX ADMIN — SODIUM CHLORIDE SCH MLS/HR: 0.9 INJECTION, SOLUTION INTRAVENOUS at 15:07

## 2019-10-15 RX ADMIN — POTASSIUM CHLORIDE SCH MEQ: 1500 TABLET, EXTENDED RELEASE ORAL at 08:42

## 2019-10-15 RX ADMIN — HYDROCODONE BITARTRATE AND ACETAMINOPHEN PRN TAB: 10; 325 TABLET ORAL at 20:50

## 2019-10-15 RX ADMIN — SODIUM CHLORIDE SCH MLS/HR: 0.9 INJECTION, SOLUTION INTRAVENOUS at 10:04

## 2019-10-15 RX ADMIN — DULOXETINE SCH MG: 60 CAPSULE, DELAYED RELEASE ORAL at 10:04

## 2019-10-15 NOTE — PN
DATE:  



SUBJECTIVE:  The patient is resting flat, sleeping comfortably, in no apparent

distress.  She is sleeping comfortably as she was given Ativan this afternoon,

she was crying, although she managed to get out and used a bedside commode.  Her

Deleon catheter was removed.  She managed to eat some jello and drink ____ little

fluid; however, she continued to be very confused and she is not awake enough or

lucid enough to be seen by psychiatrist for evaluation and perhaps transfer to

inpatient psychiatric treatment.



PHYSICAL EXAMINATION:

GENERAL:  When I saw her this afternoon, she looked pale, no jaundice, cyanosis

or thyromegaly.  No jugular venous distention.  No lower limb edema.

VITAL SIGNS:  Her heart rate was 96, blood pressure 141/97, temperature was

97.5, respiratory rate was 24, and oxygen saturation was 99% on room air.

HEAD, EYES, EARS, NOSE AND THROAT:  Normocephalic, atraumatic.

NECK:  Supple.

HEART:  Showed normal first and second heart sounds.  No gallop, rub or murmur.

CHEST:  Clear to auscultation.  No crepitation or rhonchi.

ABDOMEN:  Distended, soft, nontender.  No guarding or rigidity.  No

organomegaly.  All hernial orifice intact.  Bowel sounds normal.

NEUROLOGIC:  She is drowsy and she was given Ativan ____.  She moves her

extremities spontaneously and she managed to get out of bed to bedside commode.



Her intake over the last 24 hours was 2000, output was 2150.



LABORATORY DATA:  As of this morning, her serum sodium 141, potassium 3.6,

chloride 108, bicarbonate 19, anion gap of 14, BUN 10, creatinine 0.8, estimated

GFR was 75 mL per minute.  Her glucose was 78, calcium was 8.7.  Her CK was 252.

 Her white cell count was 9000, hemoglobin 12, hematocrit 37, MCV 94 and

platelet count was 26,000.



ASSESSMENT:  Suicidal attempt by an overdose on cyclobenzaprine.  She took 90

tablets.  She has multiple other medical problems including hypertension,

hyperlipidemia, severe depression and chronic back pain.



PLAN:  Continue with IV fluid, continue with Ativan for agitation or

restlessness.





______________________________

IDALIA GROSS MD



DR:  BRITTNEY/tia  JOB#:  [id number] / [id number]

DD:  10/14/2019 14:38  DT:  10/15/2019 00:24

## 2019-10-15 NOTE — PN
DATE:  10/15/2019



SUBJECTIVE:  The patient is a 49-year-old  female patient who was

admitted with an overdose.  She took 90 tablets of the 10 mg Flexeril in

suicidal attempt.  She was admitted on 10/12/2019 and we have treated her as per

[facility], monitoring her corrected QT interval and QRS complex. 

She did receive magnesium sulfate intravenously as her corrected QT interval was

more than 500.  However, QRS complex never exceeded 80 milliseconds.  She has

been now awake, alert and lucid, has been up and about, has managed to eat her

breakfast and lunch, had a shower and she is medically stable.



PHYSICAL EXAMINATION:

GENERAL:  When I saw her this afternoon, she was resting slightly propped up in

bed, in no apparent distress, awake, alert, responding appropriately.

VITAL SIGNS:  Her heart rate was 97, blood pressure was 113/67, temperature was

98.6, respiratory rate was 16, and oxygen saturation was 98% on room air.

HEAD, EYES, EARS, NOSE AND THROAT:  Showed that she is normocephalic,

atraumatic.

NECK:  Supple.

HEART:  Showed normal first and second heart sounds.  No gallop or murmur.

CHEST:  Clear to auscultation.  No crepitation or rhonchi.

ABDOMEN:  Slightly distended, soft, nontender.  No guarding or rigidity.  No

organomegaly.  All hernial orifice intact.  Bowel sounds normal.

NEUROLOGIC:  She is awake, alert, oriented to time, place and person.  All

cranial nerves intact.  She moves extremities without difficulty.  She ambulates

without assistance or assistive devices.



LABORATORY DATA:  Showed a white cell count of 9100, hemoglobin 12, hematocrit

37, MCV 94 and platelet count 206,000.  Her chemistry this morning showed a

serum sodium 142, potassium 3.6, chloride 110, bicarbonate 20, anion gap of 12,

BUN 15, creatinine was 1.  Estimated GFR was 58 mL per minute.  Her glucose 156,

calcium was 8.4.  Her total protein was 6.4, albumin 3.5.  Her serum

triglycerides 112, total cholesterol 199, LDL was 132, VLDL was 22, HDL was 45

and the ratio was 4.  Her prothrombin time, INR and aPTT were normal. 

Urinalysis was essentially unremarkable.  Toxic screen was positive for opiates.



ASSESSMENT AND PLAN:  The patient is medically stable.  All her lab works are

well within normal range.  She is awake, alert, oriented to time, place and

person and is medically stable for transfer for inpatient psychiatric treatment.





______________________________

IDALIA GROSS MD



DR:  BRITTNEY/tia  JOB#:  [id number] / [id number]

DD:  10/15/2019 17:04  DT:  10/15/2019 17:17

## 2020-02-20 NOTE — EKG
[facility] [address], [place] [address]

Test Date:    2019-10-12               Test Time:    22:27:30

Pat Name:     IHSAN JACINTO           Department:   

Patient ID:   [id number]           Room:         ICU02 1

Gender:       F                        Technician:   

:          [id number]               Requested By: IDALIA GROSS

Order Number: [id number]            Reading MD:     

                                 Measurements

Intervals                              Axis          

Rate:         100                      P:            0

NH:           144                      QRS:          -12

QRSD:         78                       T:            -26

QT:           304                                    

QTc:          395                                    

                           Interpretive Statements

SINUS RHYTHM

VENTRICULAR PREMATURE COMPLEX(ES)

LEFTWARD AXIS

ST & T ABNORMALITY, CONSIDER

ANTEROLATERAL ISCHEMIA OR LEFT VENTRICULAR STRAIN

INFEROLATERAL ISCHEMIA OR LEFT VENTRICULAR STRAIN

ABNORMAL ECG

RI6.02

No previous ECG available for comparison oriented to person, place and time